# Patient Record
Sex: FEMALE | URBAN - METROPOLITAN AREA
[De-identification: names, ages, dates, MRNs, and addresses within clinical notes are randomized per-mention and may not be internally consistent; named-entity substitution may affect disease eponyms.]

---

## 2022-05-26 ENCOUNTER — APPOINTMENT (RX ONLY)
Dept: URBAN - METROPOLITAN AREA CLINIC 145 | Facility: CLINIC | Age: 77
Setting detail: DERMATOLOGY
End: 2022-05-26

## 2022-05-26 ENCOUNTER — RX ONLY (OUTPATIENT)
Age: 77
Setting detail: RX ONLY
End: 2022-05-26

## 2022-05-26 DIAGNOSIS — H02.83 DERMATOCHALASIS OF EYELID: ICD-10-CM

## 2022-05-26 DIAGNOSIS — Z41.9 ENCOUNTER FOR PROCEDURE FOR PURPOSES OTHER THAN REMEDYING HEALTH STATE, UNSPECIFIED: ICD-10-CM

## 2022-05-26 PROBLEM — H02.831 DERMATOCHALASIS OF RIGHT UPPER EYELID: Status: ACTIVE | Noted: 2022-05-26

## 2022-05-26 PROBLEM — H02.834 DERMATOCHALASIS OF LEFT UPPER EYELID: Status: ACTIVE | Noted: 2022-05-26

## 2022-05-26 PROCEDURE — ? PRESCRIPTION MEDICATION MANAGEMENT

## 2022-05-26 PROCEDURE — ? SMOKING CESSATION COUNSELING

## 2022-05-26 PROCEDURE — ? DEFER

## 2022-05-26 PROCEDURE — ? COSMETIC CONSULTATION: BLEPHAROPLASTY

## 2022-05-26 PROCEDURE — ? COSMETIC SURGICAL QUOTE - FACE

## 2022-05-26 PROCEDURE — ? COSMETIC CONSULTATION - FACELIFT

## 2022-05-26 RX ORDER — TRETIONIN 1 MG/G
CREAM TOPICAL
Qty: 20 | Refills: 0 | COMMUNITY
Start: 2022-05-26

## 2022-05-26 ASSESSMENT — LOCATION DETAILED DESCRIPTION DERM
LOCATION DETAILED: LEFT LATERAL SUPERIOR EYELID
LOCATION DETAILED: RIGHT SUPERIOR MEDIAL FOREHEAD
LOCATION DETAILED: RIGHT LATERAL SUPERIOR EYELID
LOCATION DETAILED: RIGHT MID TEMPLE
LOCATION DETAILED: RIGHT LATERAL MALAR CHEEK
LOCATION DETAILED: RIGHT SUPERIOR ANTERIOR NECK
LOCATION DETAILED: LEFT MID TEMPLE
LOCATION DETAILED: LEFT INFERIOR LATERAL MALAR CHEEK

## 2022-05-26 ASSESSMENT — LOCATION SIMPLE DESCRIPTION DERM
LOCATION SIMPLE: RIGHT SUPERIOR EYELID
LOCATION SIMPLE: RIGHT FOREHEAD
LOCATION SIMPLE: LEFT SUPERIOR EYELID
LOCATION SIMPLE: RIGHT ANTERIOR NECK
LOCATION SIMPLE: RIGHT CHEEK
LOCATION SIMPLE: RIGHT TEMPLE
LOCATION SIMPLE: LEFT TEMPLE
LOCATION SIMPLE: LEFT CHEEK

## 2022-05-26 ASSESSMENT — LOCATION ZONE DERM
LOCATION ZONE: FACE
LOCATION ZONE: NECK
LOCATION ZONE: EYELID

## 2022-05-26 NOTE — HPI: COSMETIC CONSULTATION
Additional History: Pt has been bothered for years. 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 Seeing improvement in dyschromia

## 2022-05-26 NOTE — PROCEDURE: COSMETIC CONSULTATION - FACELIFT
Count Minor/Major Decisions Toward Mdm (Not Cosmetic)?: No
Detail Level: Detailed
Price (Use Numbers Only, No Special Characters Or $): 100

## 2022-05-26 NOTE — PROCEDURE: DEFER
Detail Level: Detailed
Scheduling Instructions (Optional): She is pursuing an insurance blepharoplasty with oculoplastics before her facelift
Introduction Text (Please End With A Colon): Pt will return for treatment:
Instructions (Optional): Pt needs to dc all nicotine use for 1 month before surgery.
Reason To Defer Override: Wishes to bill insurance for upper lids.
Instructions (Optional): Referred to oculoplastics for insurance blepharoplasty

## 2022-05-26 NOTE — PROCEDURE: SMOKING CESSATION COUNSELING
Time Spent Counseling (In Minutes - Optional): 5
Counseling Text: Discussed nicotine and impact on wound healing and complication rate, discussed resources to assist the patient in d/c smoking
Detail Level: Detailed

## 2022-05-26 NOTE — PROCEDURE: PRESCRIPTION MEDICATION MANAGEMENT
Render In Strict Bullet Format?: No
Initiate Treatment: Tretinoin 0.1% cream
Detail Level: Zone
Plan: Dispensed in office

## 2022-11-01 ENCOUNTER — RX ONLY (OUTPATIENT)
Age: 77
Setting detail: RX ONLY
End: 2022-11-01

## 2022-11-01 RX ORDER — TRETIONIN 1 MG/G
CREAM TOPICAL
Qty: 45 | Refills: 2 | Status: ERX